# Patient Record
Sex: FEMALE | Race: OTHER
[De-identification: names, ages, dates, MRNs, and addresses within clinical notes are randomized per-mention and may not be internally consistent; named-entity substitution may affect disease eponyms.]

---

## 2020-09-23 ENCOUNTER — HOSPITAL ENCOUNTER (INPATIENT)
Dept: HOSPITAL 15 - ER | Age: 60
LOS: 2 days | Discharge: HOME | DRG: 190 | End: 2020-09-25
Attending: INTERNAL MEDICINE | Admitting: NURSE PRACTITIONER
Payer: MEDICAID

## 2020-09-23 VITALS — BODY MASS INDEX: 28.8 KG/M2 | HEIGHT: 62 IN | WEIGHT: 156.53 LBS

## 2020-09-23 DIAGNOSIS — K21.9: ICD-10-CM

## 2020-09-23 DIAGNOSIS — I11.0: ICD-10-CM

## 2020-09-23 DIAGNOSIS — Z82.5: ICD-10-CM

## 2020-09-23 DIAGNOSIS — D64.9: ICD-10-CM

## 2020-09-23 DIAGNOSIS — Z83.3: ICD-10-CM

## 2020-09-23 DIAGNOSIS — Z80.0: ICD-10-CM

## 2020-09-23 DIAGNOSIS — I21.4: Primary | ICD-10-CM

## 2020-09-23 DIAGNOSIS — J44.9: ICD-10-CM

## 2020-09-23 DIAGNOSIS — Z82.49: ICD-10-CM

## 2020-09-23 DIAGNOSIS — E78.5: ICD-10-CM

## 2020-09-23 DIAGNOSIS — I70.0: ICD-10-CM

## 2020-09-23 DIAGNOSIS — Z95.5: ICD-10-CM

## 2020-09-23 DIAGNOSIS — I25.110: ICD-10-CM

## 2020-09-23 DIAGNOSIS — E11.51: ICD-10-CM

## 2020-09-23 DIAGNOSIS — I50.43: ICD-10-CM

## 2020-09-23 DIAGNOSIS — Z79.84: ICD-10-CM

## 2020-09-23 DIAGNOSIS — M40.204: ICD-10-CM

## 2020-09-23 DIAGNOSIS — N28.1: ICD-10-CM

## 2020-09-23 DIAGNOSIS — I25.2: ICD-10-CM

## 2020-09-23 LAB
ALBUMIN SERPL-MCNC: 3.5 G/DL (ref 3.4–5)
ALP SERPL-CCNC: 147 U/L (ref 45–117)
ALT SERPL-CCNC: 26 U/L (ref 13–56)
ANION GAP SERPL CALCULATED.3IONS-SCNC: 6 MMOL/L (ref 5–15)
APTT PPP: 21 SEC (ref 23–31.2)
BILIRUB SERPL-MCNC: 0.2 MG/DL (ref 0.2–1)
BUN SERPL-MCNC: 14 MG/DL (ref 7–18)
BUN/CREAT SERPL: 14.3
CALCIUM SERPL-MCNC: 9 MG/DL (ref 8.5–10.1)
CHLORIDE SERPL-SCNC: 111 MMOL/L (ref 98–107)
CO2 SERPL-SCNC: 23 MMOL/L (ref 21–32)
GLUCOSE SERPL-MCNC: 291 MG/DL (ref 74–106)
HCT VFR BLD AUTO: 25.4 % (ref 36–46)
HGB BLD-MCNC: 8.1 G/DL (ref 12.2–16.2)
INR PPP: 0.97 (ref 0.9–1.15)
MCH RBC QN AUTO: 23.8 PG (ref 28–32)
MCV RBC AUTO: 74.6 FL (ref 80–100)
NRBC BLD QL AUTO: 0.1 %
POTASSIUM SERPL-SCNC: 4.1 MMOL/L (ref 3.5–5.1)
PROT SERPL-MCNC: 7.1 G/DL (ref 6.4–8.2)
SODIUM SERPL-SCNC: 140 MMOL/L (ref 136–145)

## 2020-09-23 PROCEDURE — 83540 ASSAY OF IRON: CPT

## 2020-09-23 PROCEDURE — 81001 URINALYSIS AUTO W/SCOPE: CPT

## 2020-09-23 PROCEDURE — 83880 ASSAY OF NATRIURETIC PEPTIDE: CPT

## 2020-09-23 PROCEDURE — 82962 GLUCOSE BLOOD TEST: CPT

## 2020-09-23 PROCEDURE — 71045 X-RAY EXAM CHEST 1 VIEW: CPT

## 2020-09-23 PROCEDURE — 86901 BLOOD TYPING SEROLOGIC RH(D): CPT

## 2020-09-23 PROCEDURE — 96374 THER/PROPH/DIAG INJ IV PUSH: CPT

## 2020-09-23 PROCEDURE — 83735 ASSAY OF MAGNESIUM: CPT

## 2020-09-23 PROCEDURE — 85379 FIBRIN DEGRADATION QUANT: CPT

## 2020-09-23 PROCEDURE — 93306 TTE W/DOPPLER COMPLETE: CPT

## 2020-09-23 PROCEDURE — 86850 RBC ANTIBODY SCREEN: CPT

## 2020-09-23 PROCEDURE — 93005 ELECTROCARDIOGRAM TRACING: CPT

## 2020-09-23 PROCEDURE — 83550 IRON BINDING TEST: CPT

## 2020-09-23 PROCEDURE — 80053 COMPREHEN METABOLIC PANEL: CPT

## 2020-09-23 PROCEDURE — 71275 CT ANGIOGRAPHY CHEST: CPT

## 2020-09-23 PROCEDURE — B2151ZZ FLUOROSCOPY OF LEFT HEART USING LOW OSMOLAR CONTRAST: ICD-10-PCS | Performed by: INTERNAL MEDICINE

## 2020-09-23 PROCEDURE — 94640 AIRWAY INHALATION TREATMENT: CPT

## 2020-09-23 PROCEDURE — 85025 COMPLETE CBC W/AUTO DIFF WBC: CPT

## 2020-09-23 PROCEDURE — 93458 L HRT ARTERY/VENTRICLE ANGIO: CPT

## 2020-09-23 PROCEDURE — 96375 TX/PRO/DX INJ NEW DRUG ADDON: CPT

## 2020-09-23 PROCEDURE — 85610 PROTHROMBIN TIME: CPT

## 2020-09-23 PROCEDURE — 85730 THROMBOPLASTIN TIME PARTIAL: CPT

## 2020-09-23 PROCEDURE — 86900 BLOOD TYPING SEROLOGIC ABO: CPT

## 2020-09-23 PROCEDURE — 96372 THER/PROPH/DIAG INJ SC/IM: CPT

## 2020-09-23 PROCEDURE — 84443 ASSAY THYROID STIM HORMONE: CPT

## 2020-09-23 PROCEDURE — 4A023N7 MEASUREMENT OF CARDIAC SAMPLING AND PRESSURE, LEFT HEART, PERCUTANEOUS APPROACH: ICD-10-PCS | Performed by: INTERNAL MEDICINE

## 2020-09-23 PROCEDURE — 80061 LIPID PANEL: CPT

## 2020-09-23 PROCEDURE — 36415 COLL VENOUS BLD VENIPUNCTURE: CPT

## 2020-09-23 PROCEDURE — 84484 ASSAY OF TROPONIN QUANT: CPT

## 2020-09-23 PROCEDURE — B2111ZZ FLUOROSCOPY OF MULTIPLE CORONARY ARTERIES USING LOW OSMOLAR CONTRAST: ICD-10-PCS | Performed by: INTERNAL MEDICINE

## 2020-09-23 PROCEDURE — 93886 INTRACRANIAL COMPLETE STUDY: CPT

## 2020-09-23 PROCEDURE — 99152 MOD SED SAME PHYS/QHP 5/>YRS: CPT

## 2020-09-23 PROCEDURE — 83036 HEMOGLOBIN GLYCOSYLATED A1C: CPT

## 2020-09-24 VITALS — DIASTOLIC BLOOD PRESSURE: 62 MMHG | SYSTOLIC BLOOD PRESSURE: 133 MMHG

## 2020-09-24 VITALS — SYSTOLIC BLOOD PRESSURE: 127 MMHG | DIASTOLIC BLOOD PRESSURE: 70 MMHG

## 2020-09-24 VITALS — SYSTOLIC BLOOD PRESSURE: 109 MMHG | DIASTOLIC BLOOD PRESSURE: 70 MMHG

## 2020-09-24 VITALS — DIASTOLIC BLOOD PRESSURE: 66 MMHG | SYSTOLIC BLOOD PRESSURE: 129 MMHG

## 2020-09-24 VITALS — SYSTOLIC BLOOD PRESSURE: 131 MMHG | DIASTOLIC BLOOD PRESSURE: 70 MMHG

## 2020-09-24 VITALS — SYSTOLIC BLOOD PRESSURE: 133 MMHG | DIASTOLIC BLOOD PRESSURE: 62 MMHG

## 2020-09-24 LAB
IRON SERPL-MCNC: 14 UG/DL (ref 50–170)
TIBC SERPL-MCNC: 396 UG/DL (ref 250–450)

## 2020-09-24 RX ADMIN — PREGABALIN SCH MG: 75 CAPSULE ORAL at 23:07

## 2020-09-24 RX ADMIN — METOPROLOL SUCCINATE SCH MG: 50 TABLET, EXTENDED RELEASE ORAL at 09:42

## 2020-09-24 RX ADMIN — SODIUM CHLORIDE SCH MLS/HR: 0.9 INJECTION, SOLUTION INTRAVENOUS at 13:00

## 2020-09-24 RX ADMIN — Medication SCH STRIP: at 12:00

## 2020-09-24 RX ADMIN — CLOPIDOGREL BISULFATE SCH MG: 75 TABLET ORAL at 09:35

## 2020-09-24 RX ADMIN — HUMAN INSULIN SCH UNITS: 100 INJECTION, SOLUTION SUBCUTANEOUS at 17:20

## 2020-09-24 RX ADMIN — PANTOPRAZOLE SODIUM SCH MG: 40 INJECTION, POWDER, FOR SOLUTION INTRAVENOUS at 10:00

## 2020-09-24 RX ADMIN — MORPHINE SULFATE PRN MG: 2 INJECTION, SOLUTION INTRAMUSCULAR; INTRAVENOUS at 03:53

## 2020-09-24 RX ADMIN — LISINOPRIL SCH MG: 5 TABLET ORAL at 09:42

## 2020-09-24 RX ADMIN — Medication SCH STRIP: at 17:20

## 2020-09-24 RX ADMIN — HUMAN INSULIN SCH UNITS: 100 INJECTION, SOLUTION SUBCUTANEOUS at 12:00

## 2020-09-24 RX ADMIN — MORPHINE SULFATE PRN MG: 2 INJECTION, SOLUTION INTRAMUSCULAR; INTRAVENOUS at 02:39

## 2020-09-24 RX ADMIN — Medication SCH STRIP: at 07:39

## 2020-09-24 RX ADMIN — Medication SCH STRIP: at 21:47

## 2020-09-24 RX ADMIN — HUMAN INSULIN SCH UNITS: 100 INJECTION, SOLUTION SUBCUTANEOUS at 06:00

## 2020-09-24 NOTE — NUR
Family is requesting to speak to MD,for further information on imaging and procedures. 

Please contact NOK, thank you.

## 2020-09-24 NOTE — NUR
Patient's sisterMonique is requesting information on patient. Per Patient do not give her 
information. I will talk to her. 

-------------------------------------------------------------------------------

Addendum: 09/24/20 at 1702 by Genie Cardenas RN

-------------------------------------------------------------------------------

patient approved to update patient's sister Monique.

## 2020-09-24 NOTE — NUR
closing shift note

Patient is comfortably resting in bed. No s/s of distress/sob noted/stated. Bed at lowest 
locked position and call light within reach. Will endorse care to NOC RN.

## 2020-09-24 NOTE — NUR
Opening Shift Note

Assumed care of patient, awake and alert.  No S/S of distress/SOB or pain.  Instructed  on 
POC and to call for assist PRN. Bed at lowest locked position and call light within reach. 
Will continue to monitor for changes Q1hr and PRN.

## 2020-09-24 NOTE — NUR
Called/paged HOSPITALIST

 HOSPITALIST called re:CRITICAL LAB  . Waiting for call back. Continue care.

## 2020-09-24 NOTE — NUR
REASSESS PT PAIN AT 0425

PT STATE CHEST PAIN HAD WENT AWAY  WILL CONTINUE  CARE AND WAITING FOR CALL BACK FROM 
HOSPITALIST 

-------------------------------------------------------------------------------

Addendum: 09/24/20 at 0735 by ESTEPHANIA RODAS RN

-------------------------------------------------------------------------------

this is for this time 0425, 09/24/20

## 2020-09-24 NOTE — NUR
Telemetry admit from ER

 RECEIVED PT FROM  ER AT 0220.PT STATE "I FEEL CHEST PRESSURE BUT NOT AS BAD AS WHEN I FIRST 
ARRIVE". PT STAT MY PAIN  IS 5/10 PLACE PT ON 2L NASAL CANNULA AND GAVE PT  MORPHINE WILL 
CONTINUE TO MONITOR 





REASSESS PT AT 0352

PT STATE "MY PAIN IS STILL 5/10 GAVE PT MORPHINE WILL PAGE HOSPITALIST 

 Continue care.

## 2020-09-24 NOTE — NUR
HOSPITALIST  returned call

HOSPITALIST  returned call, updated on patient status and reason for call, no new orders 
received.  Continue care.

## 2020-09-24 NOTE — NUR
IV insertion

IV access obtained, via clean sterile technique by inserting Left hand 22 gauge catheter at 
after 1 attempt. IV secured properly. No trauma to site. Patient tolerated well.

NOTE:

## 2020-09-24 NOTE — NUR
PATIENT RETURNED FROM CATH LAB



PATIENT RETURNED FROM CATH LAB NO SIGNS AND SYMPTOMS OF DISTRESS , BED IS AT LOWEST POSITION 
,BED RAILS UP X2 , PATIENTS CALL LIGHT IS WITHIN REACH . REMOVED 2ML OF AIR FROM THE VASBAND 
WILL CONTINUE TO MONITOR Q1H OR PRN.

## 2020-09-25 VITALS — SYSTOLIC BLOOD PRESSURE: 159 MMHG | DIASTOLIC BLOOD PRESSURE: 69 MMHG

## 2020-09-25 VITALS — DIASTOLIC BLOOD PRESSURE: 51 MMHG | SYSTOLIC BLOOD PRESSURE: 95 MMHG

## 2020-09-25 VITALS — DIASTOLIC BLOOD PRESSURE: 66 MMHG | SYSTOLIC BLOOD PRESSURE: 107 MMHG

## 2020-09-25 LAB
ALBUMIN SERPL-MCNC: 3.1 G/DL (ref 3.4–5)
ALP SERPL-CCNC: 115 U/L (ref 45–117)
ALT SERPL-CCNC: 23 U/L (ref 13–56)
ANION GAP SERPL CALCULATED.3IONS-SCNC: 4 MMOL/L (ref 5–15)
BILIRUB SERPL-MCNC: 0.2 MG/DL (ref 0.2–1)
BUN SERPL-MCNC: 16 MG/DL (ref 7–18)
BUN/CREAT SERPL: 21.3
CALCIUM SERPL-MCNC: 8.3 MG/DL (ref 8.5–10.1)
CHLORIDE SERPL-SCNC: 111 MMOL/L (ref 98–107)
CHOLEST SERPL-MCNC: 138 MG/DL (ref ?–200)
CO2 SERPL-SCNC: 26 MMOL/L (ref 21–32)
GLUCOSE SERPL-MCNC: 99 MG/DL (ref 74–106)
HCT VFR BLD AUTO: 24.3 % (ref 36–46)
HDLC SERPL-MCNC: 55 MG/DL (ref 40–59)
HGB BLD-MCNC: 7.9 G/DL (ref 12.2–16.2)
MAGNESIUM SERPL-MCNC: 2 MG/DL (ref 1.6–2.6)
MCH RBC QN AUTO: 24 PG (ref 28–32)
MCV RBC AUTO: 73.8 FL (ref 80–100)
NRBC BLD QL AUTO: 0.1 %
POTASSIUM SERPL-SCNC: 3.8 MMOL/L (ref 3.5–5.1)
PROT SERPL-MCNC: 6.2 G/DL (ref 6.4–8.2)
SODIUM SERPL-SCNC: 141 MMOL/L (ref 136–145)
TRIGL SERPL-MCNC: 124 MG/DL (ref ?–150)

## 2020-09-25 RX ADMIN — HUMAN INSULIN SCH UNITS: 100 INJECTION, SOLUTION SUBCUTANEOUS at 01:16

## 2020-09-25 RX ADMIN — HUMAN INSULIN SCH UNITS: 100 INJECTION, SOLUTION SUBCUTANEOUS at 05:58

## 2020-09-25 RX ADMIN — SODIUM CHLORIDE SCH MLS/HR: 0.9 INJECTION, SOLUTION INTRAVENOUS at 05:52

## 2020-09-25 RX ADMIN — PREGABALIN SCH MG: 75 CAPSULE ORAL at 11:02

## 2020-09-25 RX ADMIN — Medication SCH STRIP: at 05:59

## 2020-09-25 RX ADMIN — HUMAN INSULIN SCH UNITS: 100 INJECTION, SOLUTION SUBCUTANEOUS at 11:15

## 2020-09-25 RX ADMIN — METOPROLOL SUCCINATE SCH MG: 50 TABLET, EXTENDED RELEASE ORAL at 10:51

## 2020-09-25 RX ADMIN — CLOPIDOGREL BISULFATE SCH MG: 75 TABLET ORAL at 10:50

## 2020-09-25 RX ADMIN — PANTOPRAZOLE SODIUM SCH MG: 40 INJECTION, POWDER, FOR SOLUTION INTRAVENOUS at 10:49

## 2020-09-25 RX ADMIN — Medication SCH STRIP: at 11:15

## 2020-09-25 RX ADMIN — LISINOPRIL SCH MG: 5 TABLET ORAL at 10:50

## 2020-09-25 NOTE — NUR
Respiratory note:

PT ASSESSED FOR PRN MEDNEB. PT ON ROOM AIR POX 96%. PT IN NO DISTRESS AT THIS TIME. NO TX 
INDICATED. PT AWARE TO HAVE RT PAGED IF SOB.

## 2020-09-25 NOTE — NUR
Dr. Godoy and Cardiology at bedside

Dr. Godoy and Cynthia Max, NP, at bedside discussing plan of care with patient. Call 
to daughter at bedside to further answer questions and clarify plan of care. All questions 
and concerns addressed. Patient and daughter verbalized understanding. Patient and daughter 
aware that if patient develops any symptoms at all in relation to chest pain, diaphoresis, 
dizziness, etc. to return to emergency room immediately.

## 2020-09-25 NOTE — NUR
Opening Shift Note

Assuming care of patient at this time. Patient is awake and alert. Patient denies pain. 
Patient shows no signs or symptoms of distress or shortness of breath. Bed is locked and 
lowered with side rails up x2. Instructed patient on the plan of care for today and to call 
for assistance as needed. Call light within reach.

## 2020-09-25 NOTE — NUR
Discharge

Discharge instructions given as ordered. Encourage to follow up with PMD as instructed. All 
questions and concerns addressed. Patient verbalized understanding.  Medication 
reconciliation form completed and copy given to patient. Home medications held in Pharmacy 
returned to patient. IV removed with catheter intact, pressure dressing applied.  Telemetry 
unit returned to ICU. Patient taken to vehicle via wheelchair with all personal belongings, 
accompanied by staff. No distress noted at time of departure.

## 2020-09-25 NOTE — NUR
CRITICAL VALUE REPORT FROM [facility] OF LAB TROP I OF 0.591. TRENDING DOWN. S/P LEFT HEART 
CATHETER. WILL CONTINUE TO MONITOR PATIENT.